# Patient Record
Sex: MALE | Race: WHITE
[De-identification: names, ages, dates, MRNs, and addresses within clinical notes are randomized per-mention and may not be internally consistent; named-entity substitution may affect disease eponyms.]

---

## 2017-03-10 ENCOUNTER — APPOINTMENT (OUTPATIENT)
Dept: CARDIOLOGY | Facility: CLINIC | Age: 67
End: 2017-03-10

## 2017-12-01 ENCOUNTER — APPOINTMENT (OUTPATIENT)
Dept: CARDIOLOGY | Facility: CLINIC | Age: 67
End: 2017-12-01

## 2017-12-01 VITALS — HEIGHT: 62 IN | WEIGHT: 108 LBS | BODY MASS INDEX: 19.88 KG/M2

## 2017-12-01 VITALS — HEART RATE: 99 BPM | DIASTOLIC BLOOD PRESSURE: 70 MMHG | SYSTOLIC BLOOD PRESSURE: 110 MMHG

## 2018-05-16 ENCOUNTER — APPOINTMENT (OUTPATIENT)
Dept: CARDIOLOGY | Facility: CLINIC | Age: 68
End: 2018-05-16

## 2018-06-01 ENCOUNTER — APPOINTMENT (OUTPATIENT)
Dept: CARDIOLOGY | Facility: CLINIC | Age: 68
End: 2018-06-01

## 2019-07-30 ENCOUNTER — APPOINTMENT (OUTPATIENT)
Dept: CARDIOLOGY | Facility: CLINIC | Age: 69
End: 2019-07-30
Payer: MEDICARE

## 2019-07-30 VITALS
HEART RATE: 79 BPM | BODY MASS INDEX: 21.16 KG/M2 | SYSTOLIC BLOOD PRESSURE: 130 MMHG | WEIGHT: 115 LBS | DIASTOLIC BLOOD PRESSURE: 80 MMHG | HEIGHT: 62 IN

## 2019-07-30 DIAGNOSIS — K27.9 PEPTIC ULCER, SITE UNSPECIFIED, UNSPECIFIED AS ACUTE OR CHRONIC, W/OUT HEMORRHAGE OR PERFORATION: ICD-10-CM

## 2019-07-30 DIAGNOSIS — R91.1 SOLITARY PULMONARY NODULE: ICD-10-CM

## 2019-07-30 PROCEDURE — 99214 OFFICE O/P EST MOD 30 MIN: CPT

## 2019-07-30 PROCEDURE — 93000 ELECTROCARDIOGRAM COMPLETE: CPT

## 2019-07-30 NOTE — PHYSICAL EXAM
[General Appearance - Well Developed] : well developed [Well Groomed] : well groomed [Normal Appearance] : normal appearance [General Appearance - Well Nourished] : well nourished [General Appearance - In No Acute Distress] : no acute distress [No Deformities] : no deformities [Normal Conjunctiva] : the conjunctiva exhibited no abnormalities [Normal Oral Mucosa] : normal oral mucosa [Eyelids - No Xanthelasma] : the eyelids demonstrated no xanthelasmas [No Oral Cyanosis] : no oral cyanosis [No Oral Pallor] : no oral pallor [Respiration, Rhythm And Depth] : normal respiratory rhythm and effort [Exaggerated Use Of Accessory Muscles For Inspiration] : no accessory muscle use [Auscultation Breath Sounds / Voice Sounds] : lungs were clear to auscultation bilaterally [Abdomen Soft] : soft [Abdomen Tenderness] : non-tender [Abdomen Mass (___ Cm)] : no abdominal mass palpated [Abnormal Walk] : normal gait [Gait - Sufficient For Exercise Testing] : the gait was sufficient for exercise testing [Nail Clubbing] : no clubbing of the fingernails [Cyanosis, Localized] : no localized cyanosis [] : no ischemic changes [Petechial Hemorrhages (___cm)] : no petechial hemorrhages [Normal Rate] : normal [No Murmur] : no murmurs heard [Rhythm Regular] : regular [2+] : right 2+ [FreeTextEntry1] : No JVD [Rt] : no varicose veins of the right leg [Lt] : no varicose veins of the left leg

## 2019-07-30 NOTE — HISTORY OF PRESENT ILLNESS
[FreeTextEntry1] : The patient was thought to have a lesion on his 11th right rib . Had CT scan of the chest which was ok,. No chest pain No SOB

## 2019-07-30 NOTE — ASSESSMENT
[FreeTextEntry1] : The patient has known CAD thought to be mild nonobstructive CAD noted on previous CRT scan of the chest . At one time thought to have some degree of pulmonary HTN but on last echo this was normal. No SOB or chest pain LDL is at goal as is his BP

## 2019-08-01 ENCOUNTER — OTHER (OUTPATIENT)
Age: 69
End: 2019-08-01

## 2019-08-22 ENCOUNTER — APPOINTMENT (OUTPATIENT)
Dept: CARDIOLOGY | Facility: CLINIC | Age: 69
End: 2019-08-22
Payer: MEDICARE

## 2019-08-22 PROCEDURE — 93325 DOPPLER ECHO COLOR FLOW MAPG: CPT

## 2019-08-22 PROCEDURE — 93320 DOPPLER ECHO COMPLETE: CPT

## 2019-08-22 PROCEDURE — 93351 STRESS TTE COMPLETE: CPT

## 2020-07-29 ENCOUNTER — APPOINTMENT (OUTPATIENT)
Dept: CARDIOLOGY | Facility: CLINIC | Age: 70
End: 2020-07-29

## 2021-05-18 ENCOUNTER — APPOINTMENT (OUTPATIENT)
Dept: UROLOGY | Facility: CLINIC | Age: 71
End: 2021-05-18
Payer: MEDICARE

## 2021-05-18 VITALS — HEIGHT: 62 IN | BODY MASS INDEX: 21.71 KG/M2 | WEIGHT: 118 LBS | TEMPERATURE: 96.5 F

## 2021-05-18 PROCEDURE — 99072 ADDL SUPL MATRL&STAF TM PHE: CPT

## 2021-05-18 PROCEDURE — 99203 OFFICE O/P NEW LOW 30 MIN: CPT

## 2021-05-18 RX ORDER — CYCLOBENZAPRINE HYDROCHLORIDE 5 MG/1
5 TABLET, FILM COATED ORAL
Refills: 0 | Status: ACTIVE | COMMUNITY

## 2021-05-18 RX ORDER — SERTRALINE HYDROCHLORIDE 50 MG/1
50 TABLET, FILM COATED ORAL DAILY
Refills: 0 | Status: ACTIVE | COMMUNITY

## 2021-05-18 RX ORDER — SUCRALFATE 1 G/1
1 TABLET ORAL
Refills: 0 | Status: ACTIVE | COMMUNITY

## 2021-05-18 NOTE — ASSESSMENT
[FreeTextEntry1] : 70 year old male presents for microhematuria workup and general urology care. \par Patient is not bothered by urinary symptoms and requires no treatment.\par Patient does not require treatment for erections. \par Patient gets yearly PSA with primary care doctor.\par \par Patient had 2 occult blood on 2 consecutive urinalysis. Discussed with patient work up for microscopic hematuria including CT urogram (discussed allergy to dye with patient), urine lab studies, and cystoscopy (procedure discussed with patient). Patient is agreeable to plan. \par \par Plan\par -CT urogram \par -Schedule Cystoscopy\par -Urinalysis Urine Culture Urine Cytology ordered.

## 2021-05-18 NOTE — HISTORY OF PRESENT ILLNESS
[FreeTextEntry1] : JATIN ARAUJO is a 70 year year old presenting for a General Urology Check Up and Microhematuria workup.\par Patient has a past medical history of high cholesterol, benign testicular tumor, lung nodule, coronary artery disease.\par \par Urination symptoms: Patient reports sometimes feeling that he is able to urinate after already emptying bladder.  Patient reports good urinary stream. Patient reports no nocturia. Patient denies frequency and urgency. Denies gross hematuria. \par IPSS:9/35 Moderate symptoms \par \par Erections: Patient reports that he is currently not sexually active. Patient states that when he is sexually active he is able to get and maintain his erections. \par IIEF:17/25 Mild erectile dysfunction. \par \par Prostate Cancer Screening: Patient reports he has PSA drawn yearly. Patient reports he has never had elevated PSA. PSA in 2020 was 0.7. \par \par Liquid Intake: 2-3 cups of coffee daily. \par Occupation: Pharmacist \par \par Old Records:\par Urinalysis:\par 12/23/2020- Occult blood trace\par 01/13/2021- Occult blood trace\par \par Creatine- 1.0 05/2021\par GFR- 76 05/2021\par \par Primary Care Doctor: Dr. Mirna Jessica \par

## 2021-05-18 NOTE — END OF VISIT
[FreeTextEntry3] : I participated in the obtaining history, supervising examination, and formulating the treatment plan and agree with the above transcription by the physician's assistant

## 2021-05-18 NOTE — PHYSICAL EXAM
[Normal Appearance] : normal appearance [Well Groomed] : well groomed [General Appearance - In No Acute Distress] : no acute distress [] : no respiratory distress [Normal Station and Gait] : the gait and station were normal for the patient's age [No Focal Deficits] : no focal deficits [Skin Color & Pigmentation] : normal skin color and pigmentation [Oriented To Time, Place, And Person] : oriented to person, place, and time

## 2021-05-24 LAB
APPEARANCE: CLEAR
BACTERIA UR CULT: NORMAL
BILIRUBIN URINE: NEGATIVE
BLOOD URINE: NEGATIVE
COLOR: YELLOW
GLUCOSE QUALITATIVE U: NEGATIVE
KETONES URINE: NEGATIVE
LEUKOCYTE ESTERASE URINE: NEGATIVE
NITRITE URINE: NEGATIVE
PH URINE: 5.5
PROTEIN URINE: NORMAL
SPECIFIC GRAVITY URINE: 1.03
URINE CYTOLOGY: NORMAL
UROBILINOGEN URINE: NORMAL

## 2021-06-01 ENCOUNTER — OUTPATIENT (OUTPATIENT)
Dept: OUTPATIENT SERVICES | Facility: HOSPITAL | Age: 71
LOS: 1 days | Discharge: HOME | End: 2021-06-01
Payer: MEDICARE

## 2021-06-01 ENCOUNTER — RESULT REVIEW (OUTPATIENT)
Age: 71
End: 2021-06-01

## 2021-06-01 DIAGNOSIS — R31.29 OTHER MICROSCOPIC HEMATURIA: ICD-10-CM

## 2021-06-01 PROCEDURE — 74178 CT ABD&PLV WO CNTR FLWD CNTR: CPT | Mod: 26

## 2021-06-02 ENCOUNTER — TRANSCRIPTION ENCOUNTER (OUTPATIENT)
Age: 71
End: 2021-06-02

## 2021-07-02 ENCOUNTER — APPOINTMENT (OUTPATIENT)
Dept: UROLOGY | Facility: CLINIC | Age: 71
End: 2021-07-02
Payer: MEDICARE

## 2021-07-02 PROCEDURE — 99072 ADDL SUPL MATRL&STAF TM PHE: CPT

## 2021-07-02 PROCEDURE — 52000 CYSTOURETHROSCOPY: CPT

## 2022-01-12 ENCOUNTER — APPOINTMENT (OUTPATIENT)
Dept: UROLOGY | Facility: CLINIC | Age: 72
End: 2022-01-12
Payer: COMMERCIAL

## 2022-01-12 DIAGNOSIS — N40.1 BENIGN PROSTATIC HYPERPLASIA WITH LOWER URINARY TRACT SYMPMS: ICD-10-CM

## 2022-01-12 DIAGNOSIS — R31.29 OTHER MICROSCOPIC HEMATURIA: ICD-10-CM

## 2022-01-12 DIAGNOSIS — N13.8 BENIGN PROSTATIC HYPERPLASIA WITH LOWER URINARY TRACT SYMPMS: ICD-10-CM

## 2022-01-12 PROCEDURE — 99213 OFFICE O/P EST LOW 20 MIN: CPT

## 2022-01-12 NOTE — HISTORY OF PRESENT ILLNESS
[FreeTextEntry1] : 71-year-old with history of microscopic hematuria and underwent benign work-up including cystoscopy, CT scan 3 months ago.  He feels his urinary symptoms have improved since last visit.  There was BPH seen on cystoscopy

## 2022-01-12 NOTE — REVIEW OF SYSTEMS
[Fever] : no fever [Feeling Poorly] : not feeling poorly [Sore Throat] : no sore throat [Chest Pain] : no chest pain [Cough] : no cough [Constipation] : no constipation [Diarrhea] : no diarrhea [Dysuria] : no dysuria [Confused] : no confusion

## 2022-03-16 ENCOUNTER — APPOINTMENT (OUTPATIENT)
Dept: CARDIOLOGY | Facility: CLINIC | Age: 72
End: 2022-03-16
Payer: MEDICARE

## 2022-03-16 VITALS
DIASTOLIC BLOOD PRESSURE: 80 MMHG | HEART RATE: 89 BPM | WEIGHT: 122 LBS | SYSTOLIC BLOOD PRESSURE: 126 MMHG | HEIGHT: 62 IN | BODY MASS INDEX: 22.45 KG/M2

## 2022-03-16 DIAGNOSIS — I10 ESSENTIAL (PRIMARY) HYPERTENSION: ICD-10-CM

## 2022-03-16 PROCEDURE — 93000 ELECTROCARDIOGRAM COMPLETE: CPT

## 2022-03-16 PROCEDURE — 99214 OFFICE O/P EST MOD 30 MIN: CPT

## 2022-03-16 RX ORDER — ESOMEPRAZOLE MAGNESIUM 40 MG/1
40 CAPSULE, DELAYED RELEASE ORAL
Refills: 0 | Status: ACTIVE | COMMUNITY

## 2022-03-16 NOTE — CARDIOLOGY SUMMARY
[___] : [unfilled] [de-identified] : 3- NSR Normal ECG  [de-identified] : 8- ETT Echo completed 6 minutes and 17 seconds No ischemia . Trace MR trace TR

## 2022-03-16 NOTE — ASSESSMENT
[FreeTextEntry1] : The patient has known CAD thought to be mild nonobstructive CAD noted on previous CT scan of the chest . At one time thought to have some degree of pulmonary HTN but on last echo this was normal. No SOB or chest pain LDL is at goal as is his BP His pedal pulses are 2+ on left and 1+ on right

## 2022-03-16 NOTE — HISTORY OF PRESENT ILLNESS
[FreeTextEntry1] : The patient has not been seen in almost 3 years . The patient ahs not had chest pain . he has had left side rib fracture -3 . This occurred 6-7 months ago .

## 2022-03-16 NOTE — PHYSICAL EXAM
[Normal Appearance] : normal appearance [General Appearance - Well Developed] : well developed [Well Groomed] : well groomed [General Appearance - Well Nourished] : well nourished [No Deformities] : no deformities [General Appearance - In No Acute Distress] : no acute distress [Normal Conjunctiva] : the conjunctiva exhibited no abnormalities [Eyelids - No Xanthelasma] : the eyelids demonstrated no xanthelasmas [Normal Oral Mucosa] : normal oral mucosa [No Oral Pallor] : no oral pallor [No Oral Cyanosis] : no oral cyanosis [Respiration, Rhythm And Depth] : normal respiratory rhythm and effort [Exaggerated Use Of Accessory Muscles For Inspiration] : no accessory muscle use [Auscultation Breath Sounds / Voice Sounds] : lungs were clear to auscultation bilaterally [Abdomen Soft] : soft [Abdomen Tenderness] : non-tender [Abdomen Mass (___ Cm)] : no abdominal mass palpated [Abnormal Walk] : normal gait [Gait - Sufficient For Exercise Testing] : the gait was sufficient for exercise testing [Nail Clubbing] : no clubbing of the fingernails [Cyanosis, Localized] : no localized cyanosis [Petechial Hemorrhages (___cm)] : no petechial hemorrhages [] : no ischemic changes [Normal Rate] : normal [Rhythm Regular] : regular [No Murmur] : no murmurs heard [2+] : right 2+ [FreeTextEntry1] : No JVD [Rt] : no varicose veins of the right leg [Lt] : no varicose veins of the left leg

## 2022-03-16 NOTE — REASON FOR VISIT
[Follow-Up - Clinic] : a clinic follow-up of [Coronary Artery Disease] : coronary artery disease [Hyperlipidemia] : hyperlipidemia [FreeTextEntry3] : Dr. Jessica

## 2022-04-21 ENCOUNTER — APPOINTMENT (OUTPATIENT)
Dept: CARDIOLOGY | Facility: CLINIC | Age: 72
End: 2022-04-21

## 2022-04-21 ENCOUNTER — APPOINTMENT (OUTPATIENT)
Dept: CARDIOLOGY | Facility: CLINIC | Age: 72
End: 2022-04-21
Payer: MEDICARE

## 2022-04-21 DIAGNOSIS — E78.5 HYPERLIPIDEMIA, UNSPECIFIED: ICD-10-CM

## 2022-04-21 DIAGNOSIS — I25.10 ATHEROSCLEROTIC HEART DISEASE OF NATIVE CORONARY ARTERY W/OUT ANGINA PECTORIS: ICD-10-CM

## 2022-04-21 DIAGNOSIS — R94.31 ABNORMAL ELECTROCARDIOGRAM [ECG] [EKG]: ICD-10-CM

## 2022-04-21 DIAGNOSIS — I27.20 PULMONARY HYPERTENSION, UNSPECIFIED: ICD-10-CM

## 2022-04-21 PROCEDURE — 93306 TTE W/DOPPLER COMPLETE: CPT

## 2022-04-21 PROCEDURE — 93015 CV STRESS TEST SUPVJ I&R: CPT

## 2023-01-17 ENCOUNTER — APPOINTMENT (OUTPATIENT)
Dept: UROLOGY | Facility: CLINIC | Age: 73
End: 2023-01-17

## 2024-07-18 ENCOUNTER — OUTPATIENT (OUTPATIENT)
Dept: OUTPATIENT SERVICES | Facility: HOSPITAL | Age: 74
LOS: 1 days | Discharge: HOME | End: 2024-07-18
Payer: MEDICARE

## 2024-07-18 VITALS
HEIGHT: 62 IN | OXYGEN SATURATION: 99 % | RESPIRATION RATE: 18 BRPM | WEIGHT: 115.08 LBS | DIASTOLIC BLOOD PRESSURE: 77 MMHG | SYSTOLIC BLOOD PRESSURE: 124 MMHG | HEART RATE: 73 BPM | TEMPERATURE: 98 F

## 2024-07-18 VITALS — DIASTOLIC BLOOD PRESSURE: 76 MMHG | HEART RATE: 65 BPM | SYSTOLIC BLOOD PRESSURE: 124 MMHG | RESPIRATION RATE: 17 BRPM

## 2024-07-18 DIAGNOSIS — H25.12 AGE-RELATED NUCLEAR CATARACT, LEFT EYE: ICD-10-CM

## 2024-07-18 DIAGNOSIS — Z98.42 CATARACT EXTRACTION STATUS, LEFT EYE: ICD-10-CM

## 2024-07-18 DIAGNOSIS — H26.8 OTHER SPECIFIED CATARACT: ICD-10-CM

## 2024-07-18 DIAGNOSIS — Z88.5 ALLERGY STATUS TO NARCOTIC AGENT: ICD-10-CM

## 2024-07-18 DIAGNOSIS — Z96.1 PRESENCE OF INTRAOCULAR LENS: ICD-10-CM

## 2024-07-18 DIAGNOSIS — Z98.890 OTHER SPECIFIED POSTPROCEDURAL STATES: Chronic | ICD-10-CM

## 2024-07-18 PROCEDURE — V2632: CPT

## 2024-07-18 PROCEDURE — 92499 UNLISTED OPH SVC/PROCEDURE: CPT

## 2024-07-18 NOTE — ASU PATIENT PROFILE, ADULT - ABILITY TO HEAR (WITH HEARING AID OR HEARING APPLIANCE IF NORMALLY USED):

## 2024-07-18 NOTE — ASU PATIENT PROFILE, ADULT - FALL HARM RISK - HARM RISK INTERVENTIONS

## 2024-07-18 NOTE — ASU PATIENT PROFILE, ADULT - NSICDXPASTMEDICALHX_GEN_ALL_CORE_FT
PAST MEDICAL HISTORY:  History of medical problems hld, asthma, fatty liver, cataracts, depression, oa, cataracts

## 2024-07-18 NOTE — ASU PATIENT PROFILE, ADULT - NSICDXPASTSURGICALHX_GEN_ALL_CORE_FT
PAST SURGICAL HISTORY:  History of surgery deviated septum repair, left eye extropia repair, left testicular tumor removed, thyroid nodule repair.

## 2024-07-25 ENCOUNTER — OUTPATIENT (OUTPATIENT)
Dept: OUTPATIENT SERVICES | Facility: HOSPITAL | Age: 74
LOS: 1 days | Discharge: HOME | End: 2024-07-25
Payer: MEDICARE

## 2024-07-25 VITALS
TEMPERATURE: 98 F | RESPIRATION RATE: 14 BRPM | DIASTOLIC BLOOD PRESSURE: 90 MMHG | HEART RATE: 72 BPM | OXYGEN SATURATION: 99 % | WEIGHT: 115.08 LBS | SYSTOLIC BLOOD PRESSURE: 152 MMHG | HEIGHT: 62 IN

## 2024-07-25 VITALS — HEART RATE: 75 BPM | SYSTOLIC BLOOD PRESSURE: 121 MMHG | DIASTOLIC BLOOD PRESSURE: 67 MMHG

## 2024-07-25 DIAGNOSIS — Z98.890 OTHER SPECIFIED POSTPROCEDURAL STATES: Chronic | ICD-10-CM

## 2024-07-25 DIAGNOSIS — H25.11 AGE-RELATED NUCLEAR CATARACT, RIGHT EYE: ICD-10-CM

## 2024-07-25 PROCEDURE — V2632: CPT

## 2024-07-25 PROCEDURE — 92499 UNLISTED OPH SVC/PROCEDURE: CPT

## 2024-07-25 RX ORDER — ROSUVASTATIN CALCIUM 20 MG/1
1 TABLET ORAL
Refills: 0 | DISCHARGE

## 2024-07-25 RX ORDER — SERTRALINE HYDROCHLORIDE 100 MG/1
1 TABLET, FILM COATED ORAL
Refills: 0 | DISCHARGE

## 2024-07-25 NOTE — PRE-ANESTHESIA EVALUATION ADULT - MALLAMPATI CLASS
Class II - visualization of the soft palate, fauces, and uvula How Severe Is Your Rash?: moderate Is This A New Presentation, Or A Follow-Up?: Rash Additional History: History of herpes

## 2024-07-25 NOTE — ASU PATIENT PROFILE, ADULT - NSICDXPASTSURGICALHX_GEN_ALL_CORE_FT
PAST SURGICAL HISTORY:  History of surgery deviated septum repair, left eye extropia repair, left testicular tumor removed, thyroid nodule biopsy

## 2024-07-25 NOTE — ASU PATIENT PROFILE, ADULT - FALL HARM RISK - HARM RISK INTERVENTIONS

## 2024-07-31 ENCOUNTER — APPOINTMENT (OUTPATIENT)
Dept: CARDIOLOGY | Facility: CLINIC | Age: 74
End: 2024-07-31
Payer: MEDICARE

## 2024-07-31 VITALS — HEART RATE: 68 BPM | SYSTOLIC BLOOD PRESSURE: 124 MMHG | DIASTOLIC BLOOD PRESSURE: 68 MMHG

## 2024-07-31 VITALS — HEIGHT: 62 IN | BODY MASS INDEX: 20.8 KG/M2 | WEIGHT: 113 LBS

## 2024-07-31 DIAGNOSIS — I25.10 ATHEROSCLEROTIC HEART DISEASE OF NATIVE CORONARY ARTERY W/OUT ANGINA PECTORIS: ICD-10-CM

## 2024-07-31 DIAGNOSIS — K76.0 FATTY (CHANGE OF) LIVER, NOT ELSEWHERE CLASSIFIED: ICD-10-CM

## 2024-07-31 DIAGNOSIS — H26.8 OTHER SPECIFIED CATARACT: ICD-10-CM

## 2024-07-31 DIAGNOSIS — M19.90 UNSPECIFIED OSTEOARTHRITIS, UNSPECIFIED SITE: ICD-10-CM

## 2024-07-31 DIAGNOSIS — E78.5 HYPERLIPIDEMIA, UNSPECIFIED: ICD-10-CM

## 2024-07-31 PROBLEM — Z87.898 PERSONAL HISTORY OF OTHER SPECIFIED CONDITIONS: Chronic | Status: ACTIVE | Noted: 2024-07-18

## 2024-07-31 PROCEDURE — 99214 OFFICE O/P EST MOD 30 MIN: CPT | Mod: 25

## 2024-07-31 PROCEDURE — 93000 ELECTROCARDIOGRAM COMPLETE: CPT

## 2024-07-31 NOTE — PHYSICAL EXAM
[General Appearance - Well Developed] : well developed [Normal Appearance] : normal appearance [Well Groomed] : well groomed [General Appearance - Well Nourished] : well nourished [No Deformities] : no deformities [General Appearance - In No Acute Distress] : no acute distress [Normal Conjunctiva] : the conjunctiva exhibited no abnormalities [Eyelids - No Xanthelasma] : the eyelids demonstrated no xanthelasmas [Normal Oral Mucosa] : normal oral mucosa [No Oral Pallor] : no oral pallor [No Oral Cyanosis] : no oral cyanosis [Respiration, Rhythm And Depth] : normal respiratory rhythm and effort [Exaggerated Use Of Accessory Muscles For Inspiration] : no accessory muscle use [Auscultation Breath Sounds / Voice Sounds] : lungs were clear to auscultation bilaterally [Abdomen Soft] : soft [Abdomen Tenderness] : non-tender [Abdomen Mass (___ Cm)] : no abdominal mass palpated [Abnormal Walk] : normal gait [Gait - Sufficient For Exercise Testing] : the gait was sufficient for exercise testing [Nail Clubbing] : no clubbing of the fingernails [Cyanosis, Localized] : no localized cyanosis [Petechial Hemorrhages (___cm)] : no petechial hemorrhages [] : no ischemic changes [Normal Rate] : normal [Rhythm Regular] : regular [No Murmur] : no murmurs heard [2+] : right 2+ [FreeTextEntry1] : No JVD [Rt] : no varicose veins of the right leg [Lt] : no varicose veins of the left leg

## 2024-07-31 NOTE — ASSESSMENT
[FreeTextEntry1] : The patient was noted to have CAC on CT scan of the chest . Previous ischemia work up was negative and the patient has had no chest pain or SOB . He is planning on exercise . LDL was near goal .

## 2024-07-31 NOTE — HISTORY OF PRESENT ILLNESS
[FreeTextEntry1] : The patient has had no chest pain or SOB . ETT was negative for ischemia at moderate exercise load . Echo showed normal LV systolic function in 2022 . The patienthas known CAC which was noted incidentally on CT scan of the chest

## 2024-07-31 NOTE — CARDIOLOGY SUMMARY
[___] : [unfilled] [de-identified] : 3- NSR Normal ECG  [de-identified] : 8- ETT Echo completed 6 minutes and 17 seconds No ischemia . Trace MR trace TR

## 2024-08-12 ENCOUNTER — APPOINTMENT (OUTPATIENT)
Dept: CARDIOLOGY | Facility: CLINIC | Age: 74
End: 2024-08-12
Payer: MEDICARE

## 2024-08-12 DIAGNOSIS — R94.31 ABNORMAL ELECTROCARDIOGRAM [ECG] [EKG]: ICD-10-CM

## 2024-08-12 DIAGNOSIS — I25.10 ATHEROSCLEROTIC HEART DISEASE OF NATIVE CORONARY ARTERY W/OUT ANGINA PECTORIS: ICD-10-CM

## 2024-08-12 DIAGNOSIS — I27.20 PULMONARY HYPERTENSION, UNSPECIFIED: ICD-10-CM

## 2024-08-12 PROCEDURE — 93325 DOPPLER ECHO COLOR FLOW MAPG: CPT

## 2024-08-12 PROCEDURE — 93351 STRESS TTE COMPLETE: CPT

## 2024-08-12 PROCEDURE — 93320 DOPPLER ECHO COMPLETE: CPT

## 2025-07-30 ENCOUNTER — APPOINTMENT (OUTPATIENT)
Dept: CARDIOLOGY | Facility: CLINIC | Age: 75
End: 2025-07-30